# Patient Record
Sex: FEMALE | Race: WHITE | URBAN - METROPOLITAN AREA
[De-identification: names, ages, dates, MRNs, and addresses within clinical notes are randomized per-mention and may not be internally consistent; named-entity substitution may affect disease eponyms.]

---

## 2018-07-05 ENCOUNTER — APPOINTMENT (RX ONLY)
Dept: URBAN - METROPOLITAN AREA CLINIC 23 | Facility: CLINIC | Age: 48
Setting detail: DERMATOLOGY
End: 2018-07-05

## 2018-07-05 DIAGNOSIS — L82.1 OTHER SEBORRHEIC KERATOSIS: ICD-10-CM

## 2018-07-05 PROBLEM — K21.9 GASTRO-ESOPHAGEAL REFLUX DISEASE WITHOUT ESOPHAGITIS: Status: ACTIVE | Noted: 2018-07-05

## 2018-07-05 PROBLEM — D48.5 NEOPLASM OF UNCERTAIN BEHAVIOR OF SKIN: Status: ACTIVE | Noted: 2018-07-05

## 2018-07-05 PROCEDURE — 99202 OFFICE O/P NEW SF 15 MIN: CPT | Mod: 25

## 2018-07-05 PROCEDURE — ? BIOPSY BY SHAVE METHOD

## 2018-07-05 PROCEDURE — 11100: CPT

## 2018-07-05 PROCEDURE — ? COUNSELING

## 2018-07-05 ASSESSMENT — LOCATION SIMPLE DESCRIPTION DERM: LOCATION SIMPLE: LEFT SHOULDER

## 2018-07-05 ASSESSMENT — LOCATION DETAILED DESCRIPTION DERM: LOCATION DETAILED: LEFT LATERAL SHOULDER

## 2018-07-05 ASSESSMENT — LOCATION ZONE DERM: LOCATION ZONE: ARM

## 2018-07-05 NOTE — PROCEDURE: BIOPSY BY SHAVE METHOD
Biopsy Method: Dermablade
Hemostasis: Aluminum Chloride
Additional Anesthesia Volume In Cc (Will Not Render If 0): 0
Dressing: bandage
Lab Facility: [id number]
Lab: -[id number]
Wound Care: Vaseline
Notification Instructions: Patient will be notified of biopsy results. However, patient instructed to call the office if not contacted within 2 weeks.
Curettage Text: The wound bed was treated with curettage after the biopsy was performed.
Billing Type: Third-Party Bill
Consent: Written consent was obtained and risks were reviewed including but not limited to scarring, infection, bleeding, scabbing, incomplete removal, nerve damage and allergy to anesthesia.
Depth Of Biopsy: dermis
Render Post-Care Instructions In Note?: no
Cryotherapy Text: The wound bed was treated with cryotherapy after the biopsy was performed.
Anesthesia Volume In Cc (Will Not Render If 0): 0.5
Was A Bandage Applied: Yes
Electrodesiccation And Curettage Text: The wound bed was treated with electrodesiccation and curettage after the biopsy was performed.
Electrodesiccation Text: The wound bed was treated with electrodesiccation after the biopsy was performed.
Type Of Destruction Used: Curettage
Detail Level: Detailed
Biopsy Type: H and E
Post-Care Instructions: I reviewed with the patient in detail post-care instructions. Patient is to keep the biopsy site dry overnight, and then apply bacitracin twice daily until healed. Patient may apply hydrogen peroxide soaks to remove any crusting.
Anesthesia Type: 1% lidocaine with epinephrine
Silver Nitrate Text: The wound bed was treated with silver nitrate after the biopsy was performed.

## 2018-07-05 NOTE — HPI: SKIN LESION
How Severe Is Your Skin Lesion?: mild
Has Your Skin Lesion Been Treated?: not been treated
Is This A New Presentation, Or A Follow-Up?: Growth
Which Family Member (Optional)?: Half brother
Additional History: Patient states lesion has been there for years, and recently stopped using self kristyn and the spot looks a little lighter and not as scary.

## 2021-06-17 ENCOUNTER — APPOINTMENT (RX ONLY)
Dept: URBAN - METROPOLITAN AREA CLINIC 23 | Facility: CLINIC | Age: 51
Setting detail: DERMATOLOGY
End: 2021-06-17

## 2021-06-17 DIAGNOSIS — L259 CONTACT DERMATITIS AND OTHER ECZEMA, UNSPECIFIED CAUSE: ICD-10-CM

## 2021-06-17 PROBLEM — L30.8 OTHER SPECIFIED DERMATITIS: Status: ACTIVE | Noted: 2021-06-17

## 2021-06-17 PROCEDURE — ? COUNSELING

## 2021-06-17 PROCEDURE — ? ADDITIONAL NOTES

## 2021-06-17 PROCEDURE — 99213 OFFICE O/P EST LOW 20 MIN: CPT

## 2021-06-17 PROCEDURE — ? PRESCRIPTION

## 2021-06-17 PROCEDURE — ? TREATMENT REGIMEN

## 2021-06-17 RX ORDER — TRIAMCINOLONE ACETONIDE 1 MG/G
CREAM TOPICAL BID
Qty: 1 | Refills: 2 | Status: ERX | COMMUNITY
Start: 2021-06-17

## 2021-06-17 RX ADMIN — TRIAMCINOLONE ACETONIDE: 1 CREAM TOPICAL at 00:00

## 2021-06-17 ASSESSMENT — LOCATION SIMPLE DESCRIPTION DERM
LOCATION SIMPLE: ABDOMEN
LOCATION SIMPLE: UPPER BACK

## 2021-06-17 ASSESSMENT — LOCATION DETAILED DESCRIPTION DERM
LOCATION DETAILED: EPIGASTRIC SKIN
LOCATION DETAILED: INFERIOR THORACIC SPINE

## 2021-06-17 ASSESSMENT — LOCATION ZONE DERM: LOCATION ZONE: TRUNK

## 2021-06-17 NOTE — HPI: RASH
What Type Of Note Output Would You Prefer (Optional)?: Standard Output
How Severe Is Your Rash?: mild
Is This A New Presentation, Or A Follow-Up?: Rash
Additional History: Was taking valtrex once daily for 5 years. Stopped taking it consistently a few months ago.

## 2021-06-24 ENCOUNTER — APPOINTMENT (RX ONLY)
Dept: URBAN - METROPOLITAN AREA CLINIC 23 | Facility: CLINIC | Age: 51
Setting detail: DERMATOLOGY
End: 2021-06-24

## 2021-06-24 DIAGNOSIS — L259 CONTACT DERMATITIS AND OTHER ECZEMA, UNSPECIFIED CAUSE: ICD-10-CM

## 2021-06-24 PROBLEM — L30.9 DERMATITIS, UNSPECIFIED: Status: ACTIVE | Noted: 2021-06-24

## 2021-06-24 PROCEDURE — ? ADDITIONAL NOTES

## 2021-06-24 PROCEDURE — ? PRESCRIPTION

## 2021-06-24 PROCEDURE — 99214 OFFICE O/P EST MOD 30 MIN: CPT

## 2021-06-24 PROCEDURE — ? PRESCRIPTION MEDICATION MANAGEMENT

## 2021-06-24 PROCEDURE — ? TREATMENT REGIMEN

## 2021-06-24 PROCEDURE — ? COUNSELING

## 2021-06-24 RX ORDER — FLUOCINONIDE 0.5 MG/G
CREAM TOPICAL
Qty: 1 | Refills: 0 | Status: ERX | COMMUNITY
Start: 2021-06-24

## 2021-06-24 RX ORDER — FEXOFENADINE 180 MG/1
TABLET, FILM COATED ORAL
Qty: 30 | Refills: 0 | Status: ERX | COMMUNITY
Start: 2021-06-24

## 2021-06-24 RX ADMIN — FLUOCINONIDE: 0.5 CREAM TOPICAL at 00:00

## 2021-06-24 RX ADMIN — FEXOFENADINE: 180 TABLET, FILM COATED ORAL at 00:00

## 2021-06-24 ASSESSMENT — LOCATION ZONE DERM: LOCATION ZONE: TRUNK

## 2021-06-24 ASSESSMENT — LOCATION DETAILED DESCRIPTION DERM
LOCATION DETAILED: INFERIOR THORACIC SPINE
LOCATION DETAILED: EPIGASTRIC SKIN

## 2021-06-24 ASSESSMENT — LOCATION SIMPLE DESCRIPTION DERM
LOCATION SIMPLE: ABDOMEN
LOCATION SIMPLE: UPPER BACK

## 2021-07-20 ENCOUNTER — APPOINTMENT (RX ONLY)
Dept: URBAN - METROPOLITAN AREA CLINIC 26 | Facility: CLINIC | Age: 51
Setting detail: DERMATOLOGY
End: 2021-07-20

## 2021-07-20 DIAGNOSIS — L30.8 OTHER SPECIFIED DERMATITIS: ICD-10-CM

## 2021-07-20 DIAGNOSIS — L50.3 DERMATOGRAPHIC URTICARIA: ICD-10-CM

## 2021-07-20 PROCEDURE — ? COUNSELING

## 2021-07-20 PROCEDURE — ? ADDITIONAL NOTES

## 2021-07-20 PROCEDURE — ? PRESCRIPTION

## 2021-07-20 PROCEDURE — ? PHOTO-DOCUMENTATION

## 2021-07-20 PROCEDURE — ? ORDER TESTS

## 2021-07-20 PROCEDURE — ? TREATMENT REGIMEN

## 2021-07-20 PROCEDURE — 99214 OFFICE O/P EST MOD 30 MIN: CPT

## 2021-07-20 RX ORDER — MONTELUKAST 10 MG/1
TABLET, FILM COATED ORAL
Qty: 30 | Refills: 6 | Status: ERX | COMMUNITY
Start: 2021-07-20

## 2021-07-20 RX ADMIN — MONTELUKAST 1: 10 TABLET, FILM COATED ORAL at 00:00

## 2021-07-20 ASSESSMENT — LOCATION DETAILED DESCRIPTION DERM
LOCATION DETAILED: SUBXIPHOID
LOCATION DETAILED: LEFT LATERAL ABDOMEN

## 2021-07-20 ASSESSMENT — LOCATION ZONE DERM: LOCATION ZONE: TRUNK

## 2021-07-20 ASSESSMENT — LOCATION SIMPLE DESCRIPTION DERM: LOCATION SIMPLE: ABDOMEN

## 2021-07-20 NOTE — PROCEDURE: TREATMENT REGIMEN
Initiate Treatment: Montelukast qd
Modify Regimen: Increase Pepcid to bid
Otc Regimen: Zyrtec bid
Plan: Can consider Xolair with Allergist in the future
Detail Level: Simple

## 2021-07-20 NOTE — PROCEDURE: ORDER TESTS
Expected Date Of Service: 07/20/2021
Performing Laboratory: -[id number]
Bill For Surgical Tray: no
Billing Type: Third-Party Bill

## 2021-08-24 ENCOUNTER — APPOINTMENT (RX ONLY)
Dept: URBAN - METROPOLITAN AREA CLINIC 26 | Facility: CLINIC | Age: 51
Setting detail: DERMATOLOGY
End: 2021-08-24

## 2021-08-24 DIAGNOSIS — L50.3 DERMATOGRAPHIC URTICARIA: ICD-10-CM

## 2021-08-24 DIAGNOSIS — L50.8 OTHER URTICARIA: ICD-10-CM

## 2021-08-24 PROCEDURE — ? COUNSELING

## 2021-08-24 PROCEDURE — ? TREATMENT REGIMEN

## 2021-08-24 PROCEDURE — ? PRESCRIPTION

## 2021-08-24 PROCEDURE — ? ADDITIONAL NOTES

## 2021-08-24 PROCEDURE — ? MEDICATION COUNSELING

## 2021-08-24 PROCEDURE — 99213 OFFICE O/P EST LOW 20 MIN: CPT

## 2021-08-24 RX ORDER — PREDNISONE 10 MG/1
TABLET ORAL
Qty: 30 | Refills: 0 | Status: ERX | COMMUNITY
Start: 2021-08-24

## 2021-08-24 RX ADMIN — PREDNISONE: 10 TABLET ORAL at 00:00

## 2021-08-24 ASSESSMENT — LOCATION SIMPLE DESCRIPTION DERM: LOCATION SIMPLE: ABDOMEN

## 2021-08-24 ASSESSMENT — LOCATION ZONE DERM: LOCATION ZONE: TRUNK

## 2021-08-24 ASSESSMENT — LOCATION DETAILED DESCRIPTION DERM: LOCATION DETAILED: SUBXIPHOID

## 2021-08-24 NOTE — PROCEDURE: MEDICATION COUNSELING
Xelbienvenidoz Pregnancy And Lactation Text: This medication is Pregnancy Category D and is not considered safe during pregnancy.  The risk during breast feeding is also uncertain.

## 2021-08-24 NOTE — PROCEDURE: TREATMENT REGIMEN
Continue Regimen: Montelukast 10 mg: one tablet daily\\n\\nZyrtec bid\\n\\nPepcid bid
Plan: Prednisone 30 mg PRN at start of symptoms. Can consider Xolair with Allergist in the future
Detail Level: Simple

## 2021-08-24 NOTE — PROCEDURE: ADDITIONAL NOTES
Detail Level: Simple
Additional Notes: Patient consent was obtained to proceed with the visit and recommended plan of care after discussion of all risks and benefits, including the risks of COVID-19 exposure.
Render Risk Assessment In Note?: no
Additional Notes: Overall improved but went on vacation and had one terrible day with outbreak and itch after two weeks of quiet. For these rare occasions will prescribe a one time 30 mg prednisone dose.. Plan to see Allergist in two months if hives are still present and interfering with quality of life.

## 2021-08-24 NOTE — PROCEDURE: MEDICATION COUNSELING
Pt called thru [facility] to request refill on Ritalin, but request was signed off on and I could not open it    Last refill-10/20/17-#[id number]    Last OV-9/21/17    CSA on file    Methotrexate Pregnancy And Lactation Text: This medication is Pregnancy Category X and is known to cause fetal harm. This medication is excreted in breast milk.

## 2022-03-14 ENCOUNTER — APPOINTMENT (RX ONLY)
Dept: URBAN - METROPOLITAN AREA CLINIC 26 | Facility: CLINIC | Age: 52
Setting detail: DERMATOLOGY
End: 2022-03-14

## 2022-03-14 DIAGNOSIS — L60.1 ONYCHOLYSIS: ICD-10-CM

## 2022-03-14 DIAGNOSIS — L50.3 DERMATOGRAPHIC URTICARIA: ICD-10-CM

## 2022-03-14 DIAGNOSIS — L30.9 DERMATITIS, UNSPECIFIED: ICD-10-CM

## 2022-03-14 PROBLEM — L60.9 NAIL DISORDER, UNSPECIFIED: Status: ACTIVE | Noted: 2022-03-14

## 2022-03-14 PROCEDURE — 99213 OFFICE O/P EST LOW 20 MIN: CPT

## 2022-03-14 PROCEDURE — ? COUNSELING

## 2022-03-14 PROCEDURE — ? ADDITIONAL NOTES

## 2022-03-14 ASSESSMENT — LOCATION SIMPLE DESCRIPTION DERM
LOCATION SIMPLE: RIGHT GREAT TOE
LOCATION SIMPLE: LEFT UPPER BACK
LOCATION SIMPLE: RIGHT UPPER BACK

## 2022-03-14 ASSESSMENT — LOCATION DETAILED DESCRIPTION DERM
LOCATION DETAILED: LEFT SUPERIOR UPPER BACK
LOCATION DETAILED: RIGHT INFERIOR MEDIAL UPPER BACK
LOCATION DETAILED: RIGHT GREAT TOENAIL

## 2022-03-14 ASSESSMENT — LOCATION ZONE DERM
LOCATION ZONE: TOENAIL
LOCATION ZONE: TRUNK

## 2022-03-14 NOTE — PROCEDURE: COUNSELING
Detail Level: Zone
Vinegar Soaks Recommendations: 1/3 c white vinegar, 1/3c water, 1/3c hydrogen peroxide soaks  periodically
Detail Level: Detailed

## 2022-03-14 NOTE — PROCEDURE: ADDITIONAL NOTES
Detail Level: Simple
Additional Notes: Patient consent was obtained to proceed with the visit and recommended plan of care after discussion of all risks and benefits, including the risks of COVID-19 exposure.
Render Risk Assessment In Note?: no
Additional Notes: Patient feels as though it’s more of a burning sensation vs itchy.  States this is affecting her everyday activities.  Discussed Urticarial Vasculitis as a possibly dx.  Patient dicussed xolair with allergist, was only recommended to try if a allergist was able to get a free sample due to the cost of the medication. Unsure if wants to pursue
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  She’s unsure if she’s better after starting montelukast 10 mg daily she was already taking Pepcid twice a day, and she’s taking Zyrtec 10 mg BID. This rash is causing enough discomfort and irritation that she has stopped doing things like yoga and Pilates because the bras will irritate her skin. Paradoxically walking makes the rash better.The rash is mostly located on the trunk and especially under The waist bands of her bra and on her hips where her pants sit. We had a extensive discussion, she admits that stress makes it worse and that this is stressing her out. Given this I recommended she pursue Xolair which would help relieve some of the interference with her quality of life and also tell us if this is urticarial in origin. So would be for treatment as well as diagnosis. I also offered to do a biopsy but I would prefer to do so for an acutely inflamed representative section, may need to taper medicines for a completely diagnostic sample. I’ve added a sticky note in her chart so she may return for an emergency appointment for a punch biopsy should she choose to pursue that.Also, we discussed tapering medicines in a slow and methodical fashion. She was already taking Pepcid so she will stay on that but we talked about going down to once a day dosing and then every other day dosing to see if she thinks that the Singulair and the Zyrtec were helping after all. Reviewed that I did blood work to look for unusual causes of urticaria and none were found. I suspect this is an immune mediated reaction, patient states that she was hospitalized in December 2019 for kidney stone that turned into pyelonephritis. After that in March 2020 she developed a severe cold with congestion and cough, this was around the time that Covid was becoming a worldwide problem and she did not have access to testing so wasn’t sure if it was Covid related.  The rash started around that time. We discussed that post infectious [place] area is not uncommon and chronic urticarial tends to burn itself out over several years.Discussed that many early carriers have physical triggers including pressure, vibration, exercise, cold, water. The only other thought is urticarial vasculitis, but this usually is urticarial and persistent over 24 hours, and is fixed. I have not seen urticarial vasculitis associated with dermatographism before. At this juncture we left it that she can pursue Xolair, taper medicines, and or do a punch biopsy. She will think about it and decide.

## 2022-09-16 ENCOUNTER — APPOINTMENT (RX ONLY)
Dept: URBAN - METROPOLITAN AREA CLINIC 26 | Facility: CLINIC | Age: 52
Setting detail: DERMATOLOGY
End: 2022-09-16

## 2022-09-16 DIAGNOSIS — L50.3 DERMATOGRAPHIC URTICARIA: ICD-10-CM

## 2022-09-16 PROBLEM — L50.1 IDIOPATHIC URTICARIA: Status: ACTIVE | Noted: 2022-09-16

## 2022-09-16 PROCEDURE — ? BIOPSY BY PUNCH METHOD

## 2022-09-16 PROCEDURE — ? ADDITIONAL NOTES

## 2022-09-16 PROCEDURE — ? COUNSELING

## 2022-09-16 PROCEDURE — 11104 PUNCH BX SKIN SINGLE LESION: CPT

## 2022-09-16 ASSESSMENT — LOCATION ZONE DERM: LOCATION ZONE: TRUNK

## 2022-09-16 ASSESSMENT — LOCATION DETAILED DESCRIPTION DERM
LOCATION DETAILED: LEFT MID-UPPER BACK
LOCATION DETAILED: RIGHT INFERIOR MEDIAL UPPER BACK

## 2022-09-16 ASSESSMENT — LOCATION SIMPLE DESCRIPTION DERM
LOCATION SIMPLE: RIGHT UPPER BACK
LOCATION SIMPLE: LEFT UPPER BACK

## 2022-09-16 NOTE — PROCEDURE: BIOPSY BY PUNCH METHOD
Detail Level: Detailed
Was A Bandage Applied: Yes
Punch Size In Mm: 2
Biopsy Type: H and E
Anesthesia Type: 1% lidocaine with epinephrine
Anesthesia Volume In Cc (Will Not Render If 0): 1
Additional Anesthesia Volume In Cc (Will Not Render If 0): 0
Hemostasis: Wound Closure
Epidermal Sutures: 4-0 Ethilon
Wound Care: Petrolatum
dressing with hypoallergenic tape
Patient Will Remove Sutures At Home?: No
Lab: -[id number]
Lab Facility: [id number]
Consent: Verbal consent was obtained and risks were reviewed including but not limited to scarring, infection, bleeding, scabbing, incomplete removal, nerve damage and allergy to anesthesia.
Post-Care Instructions: I reviewed with the patient in detail post-care instructions. Patient is to keep the biopsy site dry overnight, and then apply Vaseline/Aquaphor twice daily until healed. Patient may apply hydrogen peroxide soaks to remove any crusting.
Home Suture Removal Text: Patient was provided a home suture removal kit and will remove their sutures at home.  If they have any questions or difficulties they will call the office.
Notification Instructions: Patient will be notified of biopsy results. However, patient instructed to call the office if not contacted within 2 weeks.
Billing Type: Third-Party Bill
Information: Selecting Yes will display possible errors in your note based on the variables you have selected. This validation is only offered as a suggestion for you. PLEASE NOTE THAT THE VALIDATION TEXT WILL BE REMOVED WHEN YOU FINALIZE YOUR NOTE. IF YOU WANT TO FAX A PRELIMINARY NOTE YOU WILL NEED TO TOGGLE THIS TO 'NO' IF YOU DO NOT WANT IT IN YOUR FAXED NOTE.

## 2022-09-16 NOTE — PROCEDURE: ADDITIONAL NOTES
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  She’s unsure if she’s better after starting montelukast 10 mg daily she was already taking Pepcid twice a day, and she’s taking Zyrtec 10 mg BID. This rash is causing enough discomfort and irritation that she has stopped doing things like yoga and Pilates because the bras will irritate her skin. Paradoxically walking makes the rash better.The rash is mostly located on the trunk and especially under The waist bands of her bra and on her hips where her pants sit. We had a extensive discussion, she admits that stress makes it worse and that this is stressing her out. Given this I recommended she pursue Xolair which would help relieve some of the interference with her quality of life and also tell us if this is urticarial in origin. So would be for treatment as well as diagnosis. I also offered to do a biopsy but I would prefer to do so for an acutely inflamed representative section, may need to taper medicines for a completely diagnostic sample. I’ve added a sticky note in her chart so she may return for an emergency appointment for a punch biopsy should she choose to pursue that.Also, we discussed tapering medicines in a slow and methodical fashion. She was already taking Pepcid so she will stay on that but we talked about going down to once a day dosing and then every other day dosing to see if she thinks that the Singulair and the Zyrtec were helping after all. Reviewed that I did blood work to look for unusual causes of urticaria and none were found. I suspect this is an immune mediated reaction, patient states that she was hospitalized in December 2019 for kidney stone that turned into pyelonephritis. After that in March 2020 she developed a severe cold with congestion and cough, this was around the time that Covid was becoming a worldwide problem and she did not have access to testing so wasn’t sure if it was Covid related.  The rash started around that time. We discussed that post infectious [place] area is not uncommon and chronic urticarial tends to burn itself out over several years.Discussed that many early carriers have physical triggers including pressure, vibration, exercise, cold, water. The only other thought is urticarial vasculitis, but this usually is urticarial and persistent over 24 hours, and is fixed. I have not seen urticarial vasculitis associated with dermatographism before. At this juncture we left it that she can pursue Xolair, taper medicines, and or do a punch biopsy. She will think about it and decide.
Render Risk Assessment In Note?: no
Detail Level: Simple
Additional Notes: Saw Dr Kay Troy allergy who agrees on Xolair. I did labs, pcp did labs, CXR, nothing. Patient very anxious the rash means something bad. Very anxious and interfering with daily life. Will do biopsy and recommend start Xolair
Additional Notes: Wound seal

## 2023-09-13 NOTE — PROCEDURE: MEDICATION COUNSELING
Render Risk Assessment In Note?: no Calcipotriene Counseling:  I discussed with the patient the risks of calcipotriene including but not limited to erythema, scaling, itching, and irritation.

## 2023-10-25 ENCOUNTER — RX ONLY (OUTPATIENT)
Age: 53
Setting detail: RX ONLY
End: 2023-10-25

## 2023-10-25 RX ORDER — MONTELUKAST SODIUM 10 MG/1
TABLET ORAL
Qty: 30 | Refills: 6 | Status: ERX

## 2024-04-22 ENCOUNTER — APPOINTMENT (RX ONLY)
Dept: URBAN - METROPOLITAN AREA CLINIC 23 | Facility: CLINIC | Age: 54
Setting detail: DERMATOLOGY
End: 2024-04-22

## 2024-04-22 DIAGNOSIS — D22 MELANOCYTIC NEVI: ICD-10-CM

## 2024-04-22 DIAGNOSIS — D18.0 HEMANGIOMA: ICD-10-CM

## 2024-04-22 DIAGNOSIS — L50.3 DERMATOGRAPHIC URTICARIA: ICD-10-CM | Status: INADEQUATELY CONTROLLED

## 2024-04-22 DIAGNOSIS — L82.1 OTHER SEBORRHEIC KERATOSIS: ICD-10-CM

## 2024-04-22 DIAGNOSIS — L81.4 OTHER MELANIN HYPERPIGMENTATION: ICD-10-CM

## 2024-04-22 PROBLEM — D22.5 MELANOCYTIC NEVI OF TRUNK: Status: ACTIVE | Noted: 2024-04-22

## 2024-04-22 PROBLEM — L50.1 IDIOPATHIC URTICARIA: Status: ACTIVE | Noted: 2024-04-22

## 2024-04-22 PROBLEM — D18.01 HEMANGIOMA OF SKIN AND SUBCUTANEOUS TISSUE: Status: ACTIVE | Noted: 2024-04-22

## 2024-04-22 PROCEDURE — ? ADDITIONAL NOTES

## 2024-04-22 PROCEDURE — ? PRESCRIPTION

## 2024-04-22 PROCEDURE — ? FULL BODY SKIN EXAM

## 2024-04-22 PROCEDURE — 99214 OFFICE O/P EST MOD 30 MIN: CPT

## 2024-04-22 PROCEDURE — ? TREATMENT REGIMEN

## 2024-04-22 PROCEDURE — ? COUNSELING

## 2024-04-22 PROCEDURE — ? SUNSCREEN RECOMMENDATIONS

## 2024-04-22 RX ORDER — MONTELUKAST SODIUM 10 MG/1
TABLET ORAL QDAY
Qty: 90 | Refills: 3 | Status: ERX

## 2024-04-22 RX ORDER — LEVOCETIRIZINE DIHYDROCHLORIDE 5 MG
TABLET ORAL
Qty: 180 | Refills: 3 | Status: ERX | COMMUNITY
Start: 2024-04-22

## 2024-04-22 RX ADMIN — Medication: at 00:00

## 2024-04-22 ASSESSMENT — LOCATION SIMPLE DESCRIPTION DERM
LOCATION SIMPLE: UPPER BACK
LOCATION SIMPLE: RIGHT UPPER BACK

## 2024-04-22 ASSESSMENT — LOCATION ZONE DERM: LOCATION ZONE: TRUNK

## 2024-04-22 ASSESSMENT — LOCATION DETAILED DESCRIPTION DERM
LOCATION DETAILED: SUPERIOR THORACIC SPINE
LOCATION DETAILED: RIGHT INFERIOR MEDIAL UPPER BACK

## 2024-04-22 NOTE — PROCEDURE: ADDITIONAL NOTES
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  She’s unsure if she’s better after starting montelukast 10 mg daily she was already taking Pepcid twice a day, and she’s taking Zyrtec 10 mg BID. This rash is causing enough discomfort and irritation that she has stopped doing things like yoga and Pilates because the bras will irritate her skin. Paradoxically walking makes the rash better.The rash is mostly located on the trunk and especially under The waist bands of her bra and on her hips where her pants sit. We had a extensive discussion, she admits that stress makes it worse and that this is stressing her out. Given this I recommended she pursue Xolair which would help relieve some of the interference with her quality of life and also tell us if this is urticarial in origin. So would be for treatment as well as diagnosis. I also offered to do a biopsy but I would prefer to do so for an acutely inflamed representative section, may need to taper medicines for a completely diagnostic sample. I’ve added a sticky note in her chart so she may return for an emergency appointment for a punch biopsy should she choose to pursue that.Also, we discussed tapering medicines in a slow and methodical fashion. She was already taking Pepcid so she will stay on that but we talked about going down to once a day dosing and then every other day dosing to see if she thinks that the Singulair and the Zyrtec were helping after all. Reviewed that I did blood work to look for unusual causes of urticaria and none were found. I suspect this is an immune mediated reaction, patient states that she was hospitalized in December 2019 for kidney stone that turned into pyelonephritis. After that in March 2020 she developed a severe cold with congestion and cough, this was around the time that Covid was becoming a worldwide problem and she did not have access to testing so wasn’t sure if it was Covid related.  The rash started around that time. We discussed that post infectious [place] area is not uncommon and chronic urticarial tends to burn itself out over several years.Discussed that many early carriers have physical triggers including pressure, vibration, exercise, cold, water. The only other thought is urticarial vasculitis, but this usually is urticarial and persistent over 24 hours, and is fixed. I have not seen urticarial vasculitis associated with dermatographism before. At this juncture we left it that she can pursue Xolair, taper medicines, and or do a punch biopsy. She will think about it and decide.
Render Risk Assessment In Note?: no
Detail Level: Simple
Additional Notes: Saw Dr Kay Troy allergy who agrees on Xolair. I did labs, pcp did labs, CXR, nothing. Patient very anxious the rash means something bad. Very anxious and interfering with daily life. Will do biopsy and recommend start Xolair
Additional Notes: 2024- patient itchy every other day. Fearful of risk of cancer with Xolair, doesn’t want to start. Wants to try zyxal first. Discussed mirtazapine as also would have anti anxiety benefit.

## 2024-04-22 NOTE — PROCEDURE: TREATMENT REGIMEN
Continue Regimen: montelukast 10mg tablet: Take 1 Tab PO QDay\\nFamotidine 20mg bid
Plan: Recommended to try Xolair. Pt is apprehensive to try Xolair due to side effects. T/c mertazepine.
Discontinue Regimen: Zyrtec 10mg bid
Initiate Treatment: Xyzal 5 mg tablet: Take 1 tab PO bid
Detail Level: Detailed

## 2024-07-01 RX ORDER — MONTELUKAST SODIUM 10 MG/1
TABLET ORAL QDAY
Qty: 90 | Refills: 3 | Status: ERX